# Patient Record
Sex: MALE | Employment: UNEMPLOYED | URBAN - METROPOLITAN AREA
[De-identification: names, ages, dates, MRNs, and addresses within clinical notes are randomized per-mention and may not be internally consistent; named-entity substitution may affect disease eponyms.]

---
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
## 2025-02-26 PROBLEM — E66.811 CLASS 1 OBESITY: Status: ACTIVE | Noted: 2025-02-26

## 2025-02-26 PROBLEM — E66.9 CHILDHOOD OBESITY: Status: ACTIVE | Noted: 2025-02-26

## 2025-02-27 ENCOUNTER — OFFICE VISIT (OUTPATIENT)
Dept: PEDIATRICS | Facility: CLINIC | Age: 20
End: 2025-02-27
Payer: COMMERCIAL

## 2025-02-27 VITALS — HEART RATE: 89 BPM | OXYGEN SATURATION: 96 % | TEMPERATURE: 97.1 F | WEIGHT: 242 LBS

## 2025-02-27 DIAGNOSIS — J02.0 STREP THROAT: Primary | ICD-10-CM

## 2025-02-27 DIAGNOSIS — H61.23 BILATERAL IMPACTED CERUMEN: ICD-10-CM

## 2025-02-27 DIAGNOSIS — J02.9 SORE THROAT: ICD-10-CM

## 2025-02-27 LAB — POC RAPID STREP: POSITIVE

## 2025-02-27 PROCEDURE — 99213 OFFICE O/P EST LOW 20 MIN: CPT

## 2025-02-27 PROCEDURE — 87880 STREP A ASSAY W/OPTIC: CPT

## 2025-02-27 PROCEDURE — 1036F TOBACCO NON-USER: CPT

## 2025-02-27 RX ORDER — ACETAMINOPHEN 500 MG
TABLET ORAL EVERY 6 HOURS PRN
COMMUNITY

## 2025-02-27 RX ORDER — AMOXICILLIN 875 MG/1
875 TABLET, FILM COATED ORAL DAILY
Qty: 10 TABLET | Refills: 0 | Status: SHIPPED | OUTPATIENT
Start: 2025-02-27 | End: 2025-03-09

## 2025-02-27 ASSESSMENT — ENCOUNTER SYMPTOMS
SORE THROAT: 1
COUGH: 1

## 2025-02-27 NOTE — PROGRESS NOTES
Pediatric Sick Encounter Note    Subjective   Patient ID: Jonas Mares is a 19 y.o. male who presents for Cough, Nasal Congestion, and Sore Throat.    Today, they are accompanied by himself    Cough  Associated symptoms include a sore throat.   Sore Throat   Associated symptoms include coughing.       Started 2 days ago   Sore throat   Body aches   Chills   No fevers   Mild congestion   Mild cough   Headaches   No abdominal pain   No v/d  Decreased appetite   Fatigue     Objective   Visit Vitals  Pulse 89   Temp 36.2 °C (97.1 °F)   Wt 110 kg (242 lb)   SpO2 96%   Smoking Status Never       Growth Percentile: @ .   Vitals:    02/27/25 0819   Weight: 110 kg (242 lb)       Physical Exam  Vitals and nursing note reviewed.   Constitutional:       General: He is not in acute distress.     Appearance: Normal appearance.   HENT:      Head: Normocephalic and atraumatic.      Right Ear: Tympanic membrane, ear canal and external ear normal. There is impacted cerumen.      Left Ear: Tympanic membrane, ear canal and external ear normal. There is impacted cerumen.      Nose: Congestion present.      Mouth/Throat:      Mouth: Mucous membranes are moist.      Pharynx: Oropharyngeal exudate and posterior oropharyngeal erythema (moderate erythema) present.   Eyes:      Conjunctiva/sclera: Conjunctivae normal.      Pupils: Pupils are equal, round, and reactive to light.   Cardiovascular:      Rate and Rhythm: Normal rate and regular rhythm.      Pulses: Normal pulses.      Heart sounds: Normal heart sounds. No murmur heard.  Pulmonary:      Effort: Pulmonary effort is normal. No respiratory distress.      Breath sounds: Normal breath sounds.   Abdominal:      General: Bowel sounds are normal. There is no distension.      Palpations: Abdomen is soft.      Tenderness: There is no abdominal tenderness.   Musculoskeletal:      Cervical back: Normal range of motion.   Lymphadenopathy:      Cervical: Cervical adenopathy present.    Skin:     General: Skin is warm.      Capillary Refill: Capillary refill takes less than 2 seconds.      Findings: No rash.   Neurological:      General: No focal deficit present.      Mental Status: He is alert.   Psychiatric:         Mood and Affect: Mood normal.         Behavior: Behavior normal.         Assessment/Plan   Jonas was seen today for cough, nasal congestion and sore throat.  Diagnoses and all orders for this visit:  Strep throat  -     amoxicillin (Amoxil) 875 mg tablet; Take 1 tablet (875 mg) by mouth once daily for 10 days.  Sore throat  -     POCT rapid strep A  Bilateral impacted cerumen      Jonas Mares is a 19 y.o.male who was seen today for Cough, Nasal Congestion, and Sore Throat. POCT strep positive. I recommended amoxicillin once a day x 10 days.  Patient is currently well appearing and well hydrated in no acute distress. Advised parent/patient to reach out if no symptom improvement or with further questions or concerns.       Libby Yu, APRN-CNP